# Patient Record
Sex: MALE | Race: WHITE | URBAN - NONMETROPOLITAN AREA
[De-identification: names, ages, dates, MRNs, and addresses within clinical notes are randomized per-mention and may not be internally consistent; named-entity substitution may affect disease eponyms.]

---

## 2021-03-09 ENCOUNTER — OFFICE VISIT (OUTPATIENT)
Dept: URBAN - NONMETROPOLITAN AREA CLINIC 13 | Facility: CLINIC | Age: 76
End: 2021-03-09
Payer: COMMERCIAL

## 2021-03-09 DIAGNOSIS — K62.5 RECTAL BLEEDING: ICD-10-CM

## 2021-03-09 DIAGNOSIS — R19.4 ALTERED BOWEL HABITS: ICD-10-CM

## 2021-03-09 DIAGNOSIS — R10.9 ABDOMINAL PAIN, UNSPECIFIED ABDOMINAL LOCATION: ICD-10-CM

## 2021-03-09 PROCEDURE — 99204 OFFICE O/P NEW MOD 45 MIN: CPT | Performed by: INTERNAL MEDICINE

## 2021-03-09 RX ORDER — SIMVASTATIN 20 MG/1
TABLET, FILM COATED ORAL
Qty: 90 DELAYED RELEASE TABLET | Status: ACTIVE | COMMUNITY

## 2021-03-09 RX ORDER — ALLOPURINOL 300 MG/1
TABLET ORAL
Qty: 90 DELAYED RELEASE TABLET | Status: ACTIVE | COMMUNITY

## 2021-03-09 RX ORDER — APIXABAN 5 MG/1
TABLET, FILM COATED ORAL
Qty: 180 DELAYED RELEASE TABLET | Status: ACTIVE | COMMUNITY

## 2021-03-09 RX ORDER — METOPROLOL TARTRATE 25 MG/1
TABLET ORAL
Qty: 90 DELAYED RELEASE TABLET | Status: ACTIVE | COMMUNITY

## 2021-03-09 RX ORDER — HYDROCORTISONE 25 MG/G
CREAM TOPICAL
Qty: 30 G | Status: ACTIVE | COMMUNITY

## 2021-03-09 RX ORDER — VALACYCLOVIR 500 MG/1
TABLET, FILM COATED ORAL
Qty: 90 DELAYED RELEASE TABLET | Status: ACTIVE | COMMUNITY

## 2021-03-09 RX ORDER — AMLODIPINE BESYLATE AND BENAZEPRIL HYDROCHLORIDE 2.5; 1 MG/1; MG/1
CAPSULE ORAL
Qty: 30 CAP | Status: ACTIVE | COMMUNITY

## 2021-03-09 RX ORDER — DORZOLAMIDE HYDROCHLORIDE AND TIMOLOL MALEATE 20; 5 MG/ML; MG/ML
SOLUTION/ DROPS OPHTHALMIC
Qty: 60 EA | Status: ACTIVE | COMMUNITY

## 2021-03-09 RX ORDER — MIRABEGRON 50 MG/1
TABLET, FILM COATED, EXTENDED RELEASE ORAL
Qty: 30 DELAYED RELEASE TABLET | Status: ACTIVE | COMMUNITY

## 2021-03-09 RX ORDER — TAMSULOSIN HYDROCHLORIDE 0.4 MG/1
CAPSULE ORAL
Qty: 30 CAP | Status: ACTIVE | COMMUNITY

## 2021-03-09 RX ORDER — IPRATROPIUM BROMIDE 21 UG/1
SPRAY NASAL
Qty: 30 MILLILITER | Status: ACTIVE | COMMUNITY

## 2021-03-09 RX ORDER — TAFLUPROST 0 MG/.3ML
SOLUTION/ DROPS OPHTHALMIC
Qty: 30 EA | Status: ACTIVE | COMMUNITY

## 2021-03-09 RX ORDER — FLUOXETINE HYDROCHLORIDE 20 MG/1
CAPSULE ORAL
Qty: 90 CAP | Status: ACTIVE | COMMUNITY

## 2021-03-09 RX ORDER — FINASTERIDE 5 MG/1
TABLET, FILM COATED ORAL
Qty: 30 DELAYED RELEASE TABLET | Status: ACTIVE | COMMUNITY

## 2021-03-09 RX ORDER — OXYBUTYNIN CHLORIDE 5 MG/1
TABLET ORAL
Qty: 60 DELAYED RELEASE TABLET | Status: ACTIVE | COMMUNITY

## 2021-03-09 NOTE — HPI-TODAY'S VISIT:
Mr. Moreno is referred by Dr. Campo for altered bowel habits and hemorrhoids.  He had a colonoscopy by Dr. Cooper approximately 3 years ago.  Over the last couple years he has had intermittent episodes of altered bowel habits.  Specifically he will have a period of constipation where he cannot go to the bathroom.  This will ultimately lead to a sense of urgency and sometimes incontinence.  This is only happened 7-8 times over the last couple years.  Recently he was placed on Senokot as well as over-the-counter stool softener.  He thinks over the last couple weeks this is helped.  He does have some hemorrhoids with occasional rectal bleeding but he denies any pain, weight loss, fevers or chills.

## 2021-03-10 ENCOUNTER — DASHBOARD ENCOUNTERS (OUTPATIENT)
Age: 76
End: 2021-03-10